# Patient Record
Sex: FEMALE | Race: WHITE | Employment: FULL TIME | URBAN - METROPOLITAN AREA
[De-identification: names, ages, dates, MRNs, and addresses within clinical notes are randomized per-mention and may not be internally consistent; named-entity substitution may affect disease eponyms.]

---

## 2018-03-24 ENCOUNTER — HOSPITAL ENCOUNTER (EMERGENCY)
Age: 58
Discharge: HOME | End: 2018-03-24

## 2018-03-24 VITALS
WEIGHT: 160 LBS | TEMPERATURE: 97.7 F | HEART RATE: 74 BPM | SYSTOLIC BLOOD PRESSURE: 144 MMHG | DIASTOLIC BLOOD PRESSURE: 60 MMHG | RESPIRATION RATE: 18 BRPM | BODY MASS INDEX: 25.06 KG/M2 | OXYGEN SATURATION: 98 %

## 2018-03-24 DIAGNOSIS — J02.9 VIRAL PHARYNGITIS: Primary | ICD-10-CM

## 2018-03-24 LAB — STREP GRP A PCR: NEGATIVE

## 2018-03-24 PROCEDURE — 87880 STREP A ASSAY W/OPTIC: CPT

## 2018-03-24 PROCEDURE — 99212 OFFICE O/P EST SF 10 MIN: CPT

## 2018-03-24 ASSESSMENT — ENCOUNTER SYMPTOMS
ABDOMINAL DISTENTION: 0
BACK PAIN: 0
DIARRHEA: 0
EYE REDNESS: 0
WHEEZING: 0
VOMITING: 0
SORE THROAT: 1
SINUS PRESSURE: 0
EYE DISCHARGE: 0
SHORTNESS OF BREATH: 0
COUGH: 0
NAUSEA: 0
EYE PAIN: 0

## 2018-03-24 NOTE — ED PROVIDER NOTES
and breath sounds normal.   Abdominal: Soft. Bowel sounds are normal. There is no tenderness. There is no rigidity, no rebound, no guarding and no CVA tenderness. Musculoskeletal: She exhibits no edema. Neurological: She is alert and oriented to person, place, and time. She has normal strength. No cranial nerve deficit or sensory deficit. Coordination and gait normal. GCS eye subscore is 4. GCS verbal subscore is 5. GCS motor subscore is 6. Skin: Skin is warm and dry. No abrasion and no rash noted. Nursing note and vitals reviewed. Procedures    MDM    ED Course        --------------------------------------------- PAST HISTORY ---------------------------------------------  Past Medical History:  has a past medical history of Pancreatitis. Past Surgical History:  has a past surgical history that includes Hysterectomy and Bladder surgery. Social History:  reports that she has quit smoking. She has never used smokeless tobacco. She reports that she does not drink alcohol or use drugs. Family History: family history is not on file. The patients home medications have been reviewed. Allergies: Patient has no known allergies. -------------------------------------------------- RESULTS -------------------------------------------------  Results for orders placed or performed during the hospital encounter of 03/24/18   Strep Screen Group A Throat   Result Value Ref Range    Strep Grp A PCR Negative Negative     No orders to display       ------------------------- NURSING NOTES AND VITALS REVIEWED ---------------------------   The nursing notes within the ED encounter and vital signs as below have been reviewed.    BP (!) 144/60   Pulse 74   Temp 97.7 °F (36.5 °C) (Oral)   Resp 18   Wt 160 lb (72.6 kg)   SpO2 98%   BMI 25.06 kg/m²   Oxygen Saturation Interpretation: Normal      ------------------------------------------ PROGRESS NOTES ------------------------------------------   I have